# Patient Record
Sex: FEMALE | URBAN - METROPOLITAN AREA
[De-identification: names, ages, dates, MRNs, and addresses within clinical notes are randomized per-mention and may not be internally consistent; named-entity substitution may affect disease eponyms.]

---

## 2023-01-09 ENCOUNTER — OFFICE (OUTPATIENT)
Dept: URBAN - METROPOLITAN AREA CLINIC 81 | Facility: CLINIC | Age: 48
End: 2023-01-09

## 2023-01-09 VITALS — WEIGHT: 115 LBS | HEIGHT: 65 IN

## 2023-01-09 DIAGNOSIS — Z83.79 FAMILY HISTORY OF OTHER DISEASES OF THE DIGESTIVE SYSTEM: ICD-10-CM

## 2023-01-09 DIAGNOSIS — K62.5 HEMORRHAGE OF ANUS AND RECTUM: ICD-10-CM

## 2023-01-09 PROCEDURE — 99213 OFFICE O/P EST LOW 20 MIN: CPT | Mod: 95

## 2023-01-09 NOTE — SERVICENOTES
Telehealth Platform Used:  [employer]
Location of patient: at home
Location of provider: office with door shut

## 2023-01-09 NOTE — SERVICEHPINOTES
Patient states she needs to have a colonoscopy done. States she was told years ago that she needed to have a colonoscopy done every 5yrs due to mothers hx of UC. States last last colonoscopy was done in 2012 she thinks, normal. No hx of polyps. She states she occasionally has some intermittent rectal bleeding. States its bright red, usually when she goes to wipe after having a BM. She denies any UGI s/s. States no cardiac hx. She states she is scheduled to have colonoscopy done in March and is going to be out of town a couple weeks in January and all of February.  She wanted to see if she needed procedure done sooner than when she is scheduled in March. She has Miralax and prep packet already.   br

## 2023-03-06 ENCOUNTER — AMBULATORY SURGICAL CENTER (OUTPATIENT)
Dept: URBAN - METROPOLITAN AREA SURGERY 18 | Facility: SURGERY | Age: 48
End: 2023-03-06
Payer: OTHER GOVERNMENT

## 2023-03-06 DIAGNOSIS — Z12.11 ENCOUNTER FOR SCREENING FOR MALIGNANT NEOPLASM OF COLON: ICD-10-CM

## 2023-03-06 PROCEDURE — G0121 COLON CA SCRN NOT HI RSK IND: HCPCS | Performed by: SPECIALIST
